# Patient Record
Sex: MALE | Race: WHITE | Employment: FULL TIME | URBAN - METROPOLITAN AREA
[De-identification: names, ages, dates, MRNs, and addresses within clinical notes are randomized per-mention and may not be internally consistent; named-entity substitution may affect disease eponyms.]

---

## 2017-01-27 ENCOUNTER — OFFICE VISIT (OUTPATIENT)
Dept: FAMILY MEDICINE | Facility: CLINIC | Age: 37
End: 2017-01-27
Payer: COMMERCIAL

## 2017-01-27 VITALS
DIASTOLIC BLOOD PRESSURE: 60 MMHG | SYSTOLIC BLOOD PRESSURE: 126 MMHG | OXYGEN SATURATION: 97 % | HEART RATE: 102 BPM | WEIGHT: 247.2 LBS | BODY MASS INDEX: 36.24 KG/M2 | TEMPERATURE: 99 F

## 2017-01-27 DIAGNOSIS — J02.0 STREP THROAT: Primary | ICD-10-CM

## 2017-01-27 DIAGNOSIS — Z13.6 CARDIOVASCULAR SCREENING; LDL GOAL LESS THAN 160: ICD-10-CM

## 2017-01-27 DIAGNOSIS — R07.0 THROAT PAIN: ICD-10-CM

## 2017-01-27 LAB
DEPRECATED S PYO AG THROAT QL EIA: ABNORMAL
MICRO REPORT STATUS: ABNORMAL
SPECIMEN SOURCE: ABNORMAL

## 2017-01-27 PROCEDURE — 99213 OFFICE O/P EST LOW 20 MIN: CPT | Performed by: NURSE PRACTITIONER

## 2017-01-27 PROCEDURE — 87880 STREP A ASSAY W/OPTIC: CPT | Performed by: NURSE PRACTITIONER

## 2017-01-27 RX ORDER — AMOXICILLIN 500 MG/1
500 CAPSULE ORAL 2 TIMES DAILY
Qty: 20 CAPSULE | Refills: 0 | Status: SHIPPED | OUTPATIENT
Start: 2017-01-27 | End: 2017-02-06

## 2017-01-27 NOTE — MR AVS SNAPSHOT
After Visit Summary   1/27/2017    Azam Chino    MRN: [id number]           Patient Information     Date Of Birth          1980        Visit Information        Provider Department      1/27/2017 1:40 PM Adrienne Russo APRN [facility]        Today's Diagnoses     Strep throat    -  1     Throat pain         CARDIOVASCULAR SCREENING; LDL GOAL LESS THAN 160           Care Instructions    Increase rest and fluids. Tylenol and/or Ibuprofen for comfort. Cool mist vaporizer. If your symptoms worsen or do not resolve follow up with your primary care provider in 2 weeks and sooner if needed.        Indications for emergent return to emergency department discussed with patient, who verbalized good understanding and agreement.  Patient understands the limitations of today's evaluation.           Pharyngitis: Strep (Confirmed)     You have had a positive test for strep throat. Strep throat is a contagious illness. It is spread by coughing, kissing or by touching others after touching your mouth or nose. Symptoms include throat pain which is worse with swallowing, aching all over, headache and fever. It is treated with antibiotic medication. This should help you start to feel better within 1-2 days.  Home care    Rest at home. Drink plenty of fluids to avoid dehydration.    No work or school for the first 2 days of taking the antibiotics. After this time, you will not be contagious. You can then return to school or work if you are feeling better.     The antibiotic medication must be taken for the full 10 days, even if you feel better. This is very important to ensure the infection is treated. It is also important to prevent drug-resistent organisms from developing. If you were given an antibiotic shot, no more antibiotics are needed.    You may use acetaminophen (Tylenol) or ibuprofen (Motrin, Advil) to control pain or fever, unless another medicine was prescribed for  this. (NOTE: If you have chronic liver or kidney disease or ever had a stomach ulcer or GI bleeding, talk with your doctor before using these medicines.)    Throat lozenges or sprays (such as Chloraseptic) help reduce pain. Gargling with warm salt water will also reduce throat pain. Dissolve 1/2 teaspoon of salt in 1 glass of warm water. This may be useful just before meals.     Soft foods are okay. Avoid salty or spicy foods.  Follow-up care  Follow up with your healthcare provider or our staff if you are not improving over the next week.  When to seek medical advice  Call your healthcare provider right away if any of these occur:    Fever as directed by your doctor     New or worsening ear pain, sinus pain, or headache    Painful lumps in the back of neck    Stiff neck    Lymph nodes are getting larger or becoming soft in the middle    Inability to swallow liquids, excessive drooling, or inability to open mouth wide due to throat pain    Signs of dehydration (very dark urine or no urine, sunken eyes, dizziness)    Trouble breathing or noisy breathing    Muffled voice    New rash    8220-3811 [employer]. [address] [place] [address]. All rights reserved. This information is not intended as a substitute for professional medical care. Always follow your healthcare professional's instructions.        Self-Care for Sore Throats  Sore throats occur for many reasons, such as colds, allergies, and infections caused by viruses or bacteria. In any case, your throat becomes red and sore. Your goal for self-care is to reduce your discomfort while giving your throat a chance to heal.    Moisten and Soothe Your Throat    Try a sip of water first thing after waking up.    Keep your throat moist by drinking 6 or more glasses of clear liquids every day.    Run a cool-air humidifier in your room overnight.    Avoid cigarette smoke.     Suck on throat lozenges, cough drops, hard candy, ice chips, or  frozen fruit-juice bars. Use the sugar-free versions if your diet or medical condition require them.  Gargle to Ease Irritation  Gargling every hour or 2 can ease irritation. Try gargling with 1 of these solutions:    1/4 teaspoon of salt in 1/2 cup of warm water    An over-the-counter anesthetic gargle  Use Medication for More Relief  Over-the-counter medication can reduce sore throat symptoms. Ask your pharmacist if you have questions about which medication to use:    Ease pain with anesthetic sprays. Aspirin or an aspirin substitute also helps. Remember, never give aspirin to anyone 18 or younger, or if you are already taking blood thinners.     For sore throats caused by allergies, try antihistamines to block the allergic reaction.    Remember: unless a sore throat is caused by a bacterial infection, antibiotics won t help you.  Prevent Future Sore Throats    Stop smoking or reduce contact with secondhand smoke. Smoke irritates the tender throat lining.    Limit contact with pets and with allergy-causing substances, such as pollen and mold.    When you re around someone with a sore throat or cold, wash your hands frequently to keep viruses or bacteria from spreading.    Don t strain your vocal cords.  Call Your Health Care Provider  Contact your doctor if you have:    A temperature over 101 F (38.3 C)    White spots on the throat    Great difficulty swallowing    Trouble breathing    A skin rash    Recent exposure to someone else with strep bacteria    Severe hoarseness and swollen glands in the neck or jaw     7634-5602 [employer]. [address] [place], [place] [address]. All rights reserved. This information is not intended as a substitute for professional medical care. Always follow your healthcare professional's instructions.              Follow-ups after your visit        Follow-up notes from your care team     See patient instructions section of the AVS Return in about 2 weeks (around  "2/10/2017), or if symptoms worsen or fail to improve, for Follow up with your primary care provider.      Future tests that were ordered for you today     Open Future Orders        Priority Expected Expires Ordered    Lipid Profile (Chol, Trig, HDL, LDL calc) Routine  2018            Who to contact     If you have questions or need follow up information about today's clinic visit or your schedule please contact [facility] directly at [phone].  Normal or non-critical lab and imaging results will be communicated to you by MyChart, letter or phone within 4 business days after the clinic has received the results. If you do not hear from us within 7 days, please contact the clinic through [facility] or phone. If you have a critical or abnormal lab result, we will notify you by phone as soon as possible.  Submit refill requests through Wire or call your pharmacy and they will forward the refill request to us. Please allow 3 business days for your refill to be completed.          Additional Information About Your Visit        [facility] Information     Wire lets you send messages to your doctor, view your test results, renew your prescriptions, schedule appointments and more. To sign up, go to [email or web address] . Click on \"Log in\" on the left side of the screen, which will take you to the Welcome page. Then click on \"Sign up Now\" on the right side of the page.     You will be asked to enter the access code listed below, as well as some personal information. Please follow the directions to create your username and password.     Your access code is: [email or web address]  Expires: 2017  2:32 PM     Your access code will  in 90 days. If you need help or a new code, please call your [facility] or [phone].        Care EveryWhere ID     This is your Care EveryWhere ID. This could be used by other organizations to access your [facility] medical records  [id number]      "   Your Vitals Were     Pulse Temperature Pulse Oximetry             102 99  F (37.2  C) (Tympanic) 97%          Blood Pressure from Last 3 Encounters:   01/27/17 126/60   01/14/16 138/90   08/05/15 126/81    Weight from Last 3 Encounters:   01/27/17 247 lb 3.2 oz (112.129 kg)   01/14/16 263 lb (119.296 kg)   08/05/15 262 lb (118.842 kg)              We Performed the Following     Strep, Rapid Screen          Today's Medication Changes          These changes are accurate as of: 1/27/17  2:32 PM.  If you have any questions, ask your nurse or doctor.               Start taking these medicines.        Dose/Directions    amoxicillin 500 MG capsule   Commonly known as:  AMOXIL   Used for:  Strep throat   Started by:  Adrienne Russo APRN CNP        Dose:  500 mg   Take 1 capsule (500 mg) by mouth 2 times daily for 10 days   Quantity:  20 capsule   Refills:  0            Where to get your medicines      These medications were sent to [id number] [address] [facility]  [address] [facility] [address]    Hours:  Closed 10-16-08 business to [facility] Phone:  [phone]    - amoxicillin 500 MG capsule             Primary Care Provider    Unknown Primary MD Zainab       No address on file        Thank you!     Thank you for choosing [facility]  for your care. Our goal is always to provide you with excellent care. Hearing back from our patients is one way we can continue to improve our services. Please take a few minutes to complete the written survey that you may receive in the mail after your visit with us. Thank you!             Your Updated Medication List - Protect others around you: Learn how to safely use, store and throw away your medicines at www.disposemymeds.org.          This list is accurate as of: 1/27/17  2:32 PM.  Always use your most recent med list.                   Brand Name Dispense Instructions for use    amoxicillin 500 MG capsule    AMOXIL     20 capsule    Take 1 capsule (500 mg) by mouth 2 times daily for 10 days       TYLENOL PO      Take 1,000 mg by mouth every 6 hours as needed for mild pain or fever

## 2017-01-27 NOTE — PROGRESS NOTES
SUBJECTIVE:                                                    Azam Chino is a 36 year old male who presents to clinic today for the following health issues:      Acute Illness   Acute illness concerns: sore throat   Onset: couple weeks ago      Fever: YES    Chills/Sweats: YES    Headache (location?): YES    Sinus Pressure:no    Conjunctivitis:  no    Ear Pain: no    Rhinorrhea: no    Congestion: no    Sore Throat: YES     Cough: no    Wheeze: no    Decreased Appetite: no    Nausea: no    Vomiting: no    Diarrhea:  no    Dysuria/Freq.: no    Fatigue/Achiness: no    Sick/Strep Exposure: YES     Therapies Tried and outcome: tylenol, nyquil- nothing has helped           Problem list and histories reviewed & adjusted, as indicated.  Additional history: as documented    Patient Active Problem List   Diagnosis     CARDIOVASCULAR SCREENING; LDL GOAL LESS THAN 160     History reviewed. No pertinent past surgical history.    Social History   Substance Use Topics     Smoking status: Current Every Day Smoker -- 15 years     Types: Dip, chew, snus or snuff     Smokeless tobacco: Current User     Types: Chew     Alcohol Use: Yes      Comment: occas.     Family History   Problem Relation Age of Onset     Eye Disorder Maternal Grandmother      blind     CANCER Paternal Grandmother      breast cancer     HEART DISEASE Paternal Grandfather      MI     CANCER Paternal Grandfather          Current Outpatient Prescriptions   Medication Sig Dispense Refill     Acetaminophen (TYLENOL PO) Take 1,000 mg by mouth every 6 hours as needed for mild pain or fever       amoxicillin (AMOXIL) 500 MG capsule Take 1 capsule (500 mg) by mouth 2 times daily for 10 days 20 capsule 0     No Known Allergies  Problem list, Medication list, Allergies, and Medical/Social/Surgical histories reviewed in EPIC and updated as appropriate.    ROS:  Constitutional, HEENT, cardiovascular, pulmonary, GI, , musculoskeletal, neuro, skin, endocrine and  psych systems are negative, except as otherwise noted.    OBJECTIVE:                                                    /60 mmHg  Pulse 102  Temp(Src) 99  F (37.2  C) (Tympanic)  Wt 247 lb 3.2 oz (112.129 kg)  SpO2 97%  Body mass index is 36.24 kg/(m^2).  GENERAL: healthy, alert and no distress, nontoxic in appearance  EYES: Eyes grossly normal to inspection, PERRL and conjunctivae and sclerae normal  HENT: ear canals and TM's normal, nose and mouth without ulcers or lesions  NECK: no adenopathy, supple with full ROM  RESP: lungs clear to auscultation - no rales, rhonchi or wheezes  CV: regular rate and rhythm, normal S1 S2, no S3 or S4, no murmur, click or rub, no peripheral edema   ABDOMEN: soft, nontender  MS: no gross musculoskeletal defects noted, no edema  No rash    Diagnostic Test Results:  Results for orders placed or performed in visit on 01/27/17 (from the past 24 hour(s))   Strep, Rapid Screen   Result Value Ref Range    Specimen Description Throat     Rapid Strep A Screen (A)      POSITIVE: Group A Streptococcal antigen detected by immunoassay.    Micro Report Status FINAL 01/27/2017         ASSESSMENT/PLAN:                                                      Problem List Items Addressed This Visit     CARDIOVASCULAR SCREENING; LDL GOAL LESS THAN 160    Relevant Orders    Lipid Profile (Chol, Trig, HDL, LDL calc)      Other Visit Diagnoses     Strep throat    -  Primary     Relevant Medications     amoxicillin (AMOXIL) 500 MG capsule     Throat pain         Relevant Orders     Strep, Rapid Screen (Completed)                Patient Instructions     Increase rest and fluids. Tylenol and/or Ibuprofen for comfort. Cool mist vaporizer. If your symptoms worsen or do not resolve follow up with your primary care provider in 2 weeks and sooner if needed.        Indications for emergent return to emergency department discussed with patient, who verbalized good understanding and agreement.  Patient  understands the limitations of today's evaluation.           Pharyngitis: Strep (Confirmed)     You have had a positive test for strep throat. Strep throat is a contagious illness. It is spread by coughing, kissing or by touching others after touching your mouth or nose. Symptoms include throat pain which is worse with swallowing, aching all over, headache and fever. It is treated with antibiotic medication. This should help you start to feel better within 1-2 days.  Home care    Rest at home. Drink plenty of fluids to avoid dehydration.    No work or school for the first 2 days of taking the antibiotics. After this time, you will not be contagious. You can then return to school or work if you are feeling better.     The antibiotic medication must be taken for the full 10 days, even if you feel better. This is very important to ensure the infection is treated. It is also important to prevent drug-resistent organisms from developing. If you were given an antibiotic shot, no more antibiotics are needed.    You may use acetaminophen (Tylenol) or ibuprofen (Motrin, Advil) to control pain or fever, unless another medicine was prescribed for this. (NOTE: If you have chronic liver or kidney disease or ever had a stomach ulcer or GI bleeding, talk with your doctor before using these medicines.)    Throat lozenges or sprays (such as Chloraseptic) help reduce pain. Gargling with warm salt water will also reduce throat pain. Dissolve 1/2 teaspoon of salt in 1 glass of warm water. This may be useful just before meals.     Soft foods are okay. Avoid salty or spicy foods.  Follow-up care  Follow up with your healthcare provider or our staff if you are not improving over the next week.  When to seek medical advice  Call your healthcare provider right away if any of these occur:    Fever as directed by your doctor     New or worsening ear pain, sinus pain, or headache    Painful lumps in the back of neck    Stiff neck    Lymph  nodes are getting larger or becoming soft in the middle    Inability to swallow liquids, excessive drooling, or inability to open mouth wide due to throat pain    Signs of dehydration (very dark urine or no urine, sunken eyes, dizziness)    Trouble breathing or noisy breathing    Muffled voice    New rash    5911-6704 [employer]. [address] [place] [address], [place] [address]. All rights reserved. This information is not intended as a substitute for professional medical care. Always follow your healthcare professional's instructions.        Self-Care for Sore Throats  Sore throats occur for many reasons, such as colds, allergies, and infections caused by viruses or bacteria. In any case, your throat becomes red and sore. Your goal for self-care is to reduce your discomfort while giving your throat a chance to heal.    Moisten and Soothe Your Throat    Try a sip of water first thing after waking up.    Keep your throat moist by drinking 6 or more glasses of clear liquids every day.    Run a cool-air humidifier in your room overnight.    Avoid cigarette smoke.     Suck on throat lozenges, cough drops, hard candy, ice chips, or frozen fruit-juice bars. Use the sugar-free versions if your diet or medical condition require them.  Gargle to Ease Irritation  Gargling every hour or 2 can ease irritation. Try gargling with 1 of these solutions:    1/4 teaspoon of salt in 1/2 cup of warm water    An over-the-counter anesthetic gargle  Use Medication for More Relief  Over-the-counter medication can reduce sore throat symptoms. Ask your pharmacist if you have questions about which medication to use:    Ease pain with anesthetic sprays. Aspirin or an aspirin substitute also helps. Remember, never give aspirin to anyone 18 or younger, or if you are already taking blood thinners.     For sore throats caused by allergies, try antihistamines to block the allergic reaction.    Remember: unless a sore throat is caused by a  bacterial infection, antibiotics won t help you.  Prevent Future Sore Throats    Stop smoking or reduce contact with secondhand smoke. Smoke irritates the tender throat lining.    Limit contact with pets and with allergy-causing substances, such as pollen and mold.    When you re around someone with a sore throat or cold, wash your hands frequently to keep viruses or bacteria from spreading.    Don t strain your vocal cords.  Call Your Health Care Provider  Contact your doctor if you have:    A temperature over 101 F (38.3 C)    White spots on the throat    Great difficulty swallowing    Trouble breathing    A skin rash    Recent exposure to someone else with strep bacteria    Severe hoarseness and swollen glands in the neck or jaw     6303-5241 [employer]. [address] [place] [address], [place] [id number]. All rights reserved. This information is not intended as a substitute for professional medical care. Always follow your healthcare professional's instructions.            HÉCTOR Mabry SAME DAY PROVIDER  [facility]

## 2017-01-27 NOTE — PATIENT INSTRUCTIONS
Increase rest and fluids. Tylenol and/or Ibuprofen for comfort. Cool mist vaporizer. If your symptoms worsen or do not resolve follow up with your primary care provider in 2 weeks and sooner if needed.        Indications for emergent return to emergency department discussed with patient, who verbalized good understanding and agreement.  Patient understands the limitations of today's evaluation.           Pharyngitis: Strep (Confirmed)     You have had a positive test for strep throat. Strep throat is a contagious illness. It is spread by coughing, kissing or by touching others after touching your mouth or nose. Symptoms include throat pain which is worse with swallowing, aching all over, headache and fever. It is treated with antibiotic medication. This should help you start to feel better within 1-2 days.  Home care    Rest at home. Drink plenty of fluids to avoid dehydration.    No work or school for the first 2 days of taking the antibiotics. After this time, you will not be contagious. You can then return to school or work if you are feeling better.     The antibiotic medication must be taken for the full 10 days, even if you feel better. This is very important to ensure the infection is treated. It is also important to prevent drug-resistent organisms from developing. If you were given an antibiotic shot, no more antibiotics are needed.    You may use acetaminophen (Tylenol) or ibuprofen (Motrin, Advil) to control pain or fever, unless another medicine was prescribed for this. (NOTE: If you have chronic liver or kidney disease or ever had a stomach ulcer or GI bleeding, talk with your doctor before using these medicines.)    Throat lozenges or sprays (such as Chloraseptic) help reduce pain. Gargling with warm salt water will also reduce throat pain. Dissolve 1/2 teaspoon of salt in 1 glass of warm water. This may be useful just before meals.     Soft foods are okay. Avoid salty or spicy foods.  Follow-up  care  Follow up with your healthcare provider or our staff if you are not improving over the next week.  When to seek medical advice  Call your healthcare provider right away if any of these occur:    Fever as directed by your doctor     New or worsening ear pain, sinus pain, or headache    Painful lumps in the back of neck    Stiff neck    Lymph nodes are getting larger or becoming soft in the middle    Inability to swallow liquids, excessive drooling, or inability to open mouth wide due to throat pain    Signs of dehydration (very dark urine or no urine, sunken eyes, dizziness)    Trouble breathing or noisy breathing    Muffled voice    New rash    1085-4465 [employer]. [address] [place] [address] 10090. All rights reserved. This information is not intended as a substitute for professional medical care. Always follow your healthcare professional's instructions.        Self-Care for Sore Throats  Sore throats occur for many reasons, such as colds, allergies, and infections caused by viruses or bacteria. In any case, your throat becomes red and sore. Your goal for self-care is to reduce your discomfort while giving your throat a chance to heal.    Moisten and Soothe Your Throat    Try a sip of water first thing after waking up.    Keep your throat moist by drinking 6 or more glasses of clear liquids every day.    Run a cool-air humidifier in your room overnight.    Avoid cigarette smoke.     Suck on throat lozenges, cough drops, hard candy, ice chips, or frozen fruit-juice bars. Use the sugar-free versions if your diet or medical condition require them.  Gargle to Ease Irritation  Gargling every hour or 2 can ease irritation. Try gargling with 1 of these solutions:    1/4 teaspoon of salt in 1/2 cup of warm water    An over-the-counter anesthetic gargle  Use Medication for More Relief  Over-the-counter medication can reduce sore throat symptoms. Ask your pharmacist if you have questions about  which medication to use:    Ease pain with anesthetic sprays. Aspirin or an aspirin substitute also helps. Remember, never give aspirin to anyone 18 or younger, or if you are already taking blood thinners.     For sore throats caused by allergies, try antihistamines to block the allergic reaction.    Remember: unless a sore throat is caused by a bacterial infection, antibiotics won t help you.  Prevent Future Sore Throats    Stop smoking or reduce contact with secondhand smoke. Smoke irritates the tender throat lining.    Limit contact with pets and with allergy-causing substances, such as pollen and mold.    When you re around someone with a sore throat or cold, wash your hands frequently to keep viruses or bacteria from spreading.    Don t strain your vocal cords.  Call Your Health Care Provider  Contact your doctor if you have:    A temperature over 101 F (38.3 C)    White spots on the throat    Great difficulty swallowing    Trouble breathing    A skin rash    Recent exposure to someone else with strep bacteria    Severe hoarseness and swollen glands in the neck or jaw     7874-6446 [employer] [address] [place] [address], [place] [address]. All rights reserved. This information is not intended as a substitute for professional medical care. Always follow your healthcare professional's instructions.

## 2017-02-23 ENCOUNTER — OFFICE VISIT (OUTPATIENT)
Dept: FAMILY MEDICINE | Facility: CLINIC | Age: 37
End: 2017-02-23
Payer: COMMERCIAL

## 2017-02-23 VITALS
WEIGHT: 250 LBS | SYSTOLIC BLOOD PRESSURE: 124 MMHG | DIASTOLIC BLOOD PRESSURE: 60 MMHG | TEMPERATURE: 98.6 F | HEART RATE: 105 BPM | BODY MASS INDEX: 36.65 KG/M2 | OXYGEN SATURATION: 94 %

## 2017-02-23 DIAGNOSIS — R07.0 THROAT PAIN: Primary | ICD-10-CM

## 2017-02-23 PROCEDURE — 87880 STREP A ASSAY W/OPTIC: CPT | Performed by: FAMILY MEDICINE

## 2017-02-23 PROCEDURE — 99213 OFFICE O/P EST LOW 20 MIN: CPT | Performed by: FAMILY MEDICINE

## 2017-02-23 NOTE — MR AVS SNAPSHOT
"              After Visit Summary   2017    Azam Chino    MRN: [id number]           Patient Information     Date Of Birth          1980        Visit Information        Provider Department      2017 1:20 PM Hemanth Reilly MD [facility]        Today's Diagnoses     Throat pain    -  1      Care Instructions    1. Ten days on meds    2. Call if any persistence    3. If it re occurs will do IM injection        Follow-ups after your visit        Who to contact     If you have questions or need follow up information about today's clinic visit or your schedule please contact [facility] directly at [phone].  Normal or non-critical lab and imaging results will be communicated to you by MyChart, letter or phone within 4 business days after the clinic has received the results. If you do not hear from us within 7 days, please contact the clinic through [employer] or phone. If you have a critical or abnormal lab result, we will notify you by phone as soon as possible.  Submit refill requests through [employer] or call your pharmacy and they will forward the refill request to us. Please allow 3 business days for your refill to be completed.          Additional Information About Your Visit        MyChart Information     [employer] lets you send messages to your doctor, view your test results, renew your prescriptions, schedule appointments and more. To sign up, go to [email or web address] . Click on \"Log in\" on the left side of the screen, which will take you to the Welcome page. Then click on \"Sign up Now\" on the right side of the page.     You will be asked to enter the access code listed below, as well as some personal information. Please follow the directions to create your username and password.     Your access code is: [email or web address]  Expires: 2017  2:32 PM     Your access code will  in 90 days. If you need help or a new code, please call your " [facility] or [phone].        Care EveryWhere ID     This is your Care EveryWhere ID. This could be used by other organizations to access your [facility] medical records  [id number]        Your Vitals Were     Pulse Temperature Pulse Oximetry BMI (Body Mass Index)          105 98.6  F (37  C) (Tympanic) 94% 36.65 kg/m2         Blood Pressure from Last 3 Encounters:   02/23/17 124/60   01/27/17 126/60   01/14/16 138/90    Weight from Last 3 Encounters:   02/23/17 250 lb (113.4 kg)   01/27/17 247 lb 3.2 oz (112.1 kg)   01/14/16 263 lb (119.3 kg)              We Performed the Following     Strep, Rapid Screen          Today's Medication Changes          These changes are accurate as of: 2/23/17  1:54 PM.  If you have any questions, ask your nurse or doctor.               Start taking these medicines.        Dose/Directions    amoxicillin-clavulanate 875-125 MG per tablet   Commonly known as:  AUGMENTIN   Used for:  Throat pain   Started by:  Hemanth Reilly MD        Dose:  1 tablet   Take 1 tablet by mouth 2 times daily   Quantity:  20 tablet   Refills:  0            Where to get your medicines      These medications were sent to [id number] [address] [facility] [address] [facility]  [address] [facility] [address]    Hours:  Closed 10-16-08 business to [facility] Phone:  [phone]     amoxicillin-clavulanate 875-125 MG per tablet                Primary Care Provider    Unknown Primary MD Zainab       No address on file        Thank you!     Thank you for choosing [facility]  for your care. Our goal is always to provide you with excellent care. Hearing back from our patients is one way we can continue to improve our services. Please take a few minutes to complete the written survey that you may receive in the mail after your visit with us. Thank you!             Your Updated Medication List - Protect others around you: Learn how to safely use, store and  throw away your medicines at www.disposemymeds.org.          This list is accurate as of: 2/23/17  1:54 PM.  Always use your most recent med list.                   Brand Name Dispense Instructions for use    amoxicillin-clavulanate 875-125 MG per tablet    AUGMENTIN    20 tablet    Take 1 tablet by mouth 2 times daily       TYLENOL PO      Take 1,000 mg by mouth every 6 hours as needed for mild pain or fever

## 2017-02-23 NOTE — NURSING NOTE
"Chief Complaint   Patient presents with     Cough     Fever     fatigue       Initial /60  Pulse 105  Temp 98.6  F (37  C) (Tympanic)  Wt 250 lb (113.4 kg)  SpO2 94%  BMI 36.65 kg/m2 Estimated body mass index is 36.65 kg/(m^2) as calculated from the following:    Height as of 8/5/15: 5' 9.25\" (1.759 m).    Weight as of this encounter: 250 lb (113.4 kg).  Medication Reconciliation: complete    Health Maintenance that is potentially due pending provider review:  NONE    n/a      "

## 2017-02-23 NOTE — PROGRESS NOTES
SUBJECTIVE:                                                    Azam Chino is a 36 year old male who presents to clinic today for the following health issues:      ENT Symptoms             Symptoms: cc Present Absent Comment   Fever/Chills  x     Fatigue  x     Muscle Aches  x     Eye Irritation   x    Sneezing   x    Nasal Tyrel/Drg  x     Sinus Pressure/Pain   x    Loss of smell   x    Dental pain   x    Sore Throat x      Swollen Glands  x     Ear Pain/Fullness   x    Cough x      Wheeze   x    Chest Pain  x  Had strep - hurts to take deep breath   Shortness of breath  x     Rash       Other         Symptom duration:  2 days   Symptom severity:  severe   Treatments tried:  tylenol, cold medicine   Contacts:  co-workers             Problem list and histories reviewed & adjusted, as indicated.  Additional history:     Patient Active Problem List   Diagnosis     CARDIOVASCULAR SCREENING; LDL GOAL LESS THAN 160     History reviewed. No pertinent past surgical history.    Social History   Substance Use Topics     Smoking status: Current Every Day Smoker     Years: 15.00     Types: Dip, chew, snus or snuff     Smokeless tobacco: Current User     Types: Chew     Alcohol use Yes      Comment: occas.     Family History   Problem Relation Age of Onset     Eye Disorder Maternal Grandmother      blind     CANCER Paternal Grandmother      breast cancer     HEART DISEASE Paternal Grandfather      MI     CANCER Paternal Grandfather          Current Outpatient Prescriptions   Medication Sig Dispense Refill     Acetaminophen (TYLENOL PO) Take 1,000 mg by mouth every 6 hours as needed for mild pain or fever         ROS:  C: NEGATIVE for fever, chills, change in weight  E/M: NEGATIVE for ear, mouth and throat problems    OBJECTIVE:                                                    /60  Pulse 105  Temp 98.6  F (37  C) (Tympanic)  Wt 250 lb (113.4 kg)  SpO2 94%  BMI 36.65 kg/m2  Body mass index is 36.65  kg/(m^2).  GENERAL: healthy, alert and no distress  HENT: normal cephalic/atraumatic, ear canals and TM's normal, nose and mouth without ulcers or lesions, oropharynx clear, oral mucous membranes moist and tonsillar erythema  MS: no gross musculoskeletal defects noted, no edema         ASSESSMENT/PLAN:                                                            1. Throat pain  Positive strep throat. Reoccurrence.  - Strep, Rapid Screen  - amoxicillin-clavulanate (AUGMENTIN) 875-125 MG per tablet; Take 1 tablet by mouth 2 times daily  Dispense: 20 tablet; Refill: 0    ASSESSMENT/PLAN:      ICD-10-CM    1. Throat pain R07.0 Strep, Rapid Screen     amoxicillin-clavulanate (AUGMENTIN) 875-125 MG per tablet       Patient Instructions   1. Ten days on meds    2. Call if any persistence    3. If it re occurs will do IM injection          Hemanth Reilly MD  [facility]

## 2017-12-22 ENCOUNTER — OFFICE VISIT (OUTPATIENT)
Dept: FAMILY MEDICINE | Facility: CLINIC | Age: 37
End: 2017-12-22
Payer: COMMERCIAL

## 2017-12-22 VITALS
BODY MASS INDEX: 37.21 KG/M2 | TEMPERATURE: 97.1 F | DIASTOLIC BLOOD PRESSURE: 88 MMHG | SYSTOLIC BLOOD PRESSURE: 144 MMHG | WEIGHT: 253.8 LBS | OXYGEN SATURATION: 98 % | HEART RATE: 80 BPM

## 2017-12-22 DIAGNOSIS — J20.9 ACUTE BRONCHITIS WITH SYMPTOMS > 10 DAYS: Primary | ICD-10-CM

## 2017-12-22 PROCEDURE — 99213 OFFICE O/P EST LOW 20 MIN: CPT | Performed by: PHYSICIAN ASSISTANT

## 2017-12-22 RX ORDER — AZITHROMYCIN 250 MG/1
TABLET, FILM COATED ORAL
Qty: 6 TABLET | Refills: 0 | Status: SHIPPED | OUTPATIENT
Start: 2017-12-22 | End: 2018-09-14

## 2017-12-22 RX ORDER — CODEINE PHOSPHATE AND GUAIFENESIN 10; 100 MG/5ML; MG/5ML
1-2 SOLUTION ORAL EVERY 4 HOURS PRN
Qty: 120 ML | Refills: 0 | Status: SHIPPED | OUTPATIENT
Start: 2017-12-22 | End: 2018-09-14

## 2017-12-22 ASSESSMENT — ENCOUNTER SYMPTOMS
MUSCULOSKELETAL NEGATIVE: 1
HEADACHES: 1
WEAKNESS: 0
STRIDOR: 0
SORE THROAT: 0
SPUTUM PRODUCTION: 0
VOMITING: 0
WHEEZING: 0
EYE PAIN: 0
COUGH: 1
EYE DISCHARGE: 0
CHILLS: 0
SHORTNESS OF BREATH: 0
FEVER: 0
PSYCHIATRIC NEGATIVE: 1
NAUSEA: 0
EYE REDNESS: 0

## 2017-12-22 NOTE — NURSING NOTE
"Chief Complaint   Patient presents with     URI       Initial /88 (BP Location: Right arm, Patient Position: Chair, Cuff Size: Adult Large)  Pulse 80  Temp 97.1  F (36.2  C) (Tympanic)  Wt 253 lb 12.8 oz (115.1 kg)  SpO2 98%  BMI 37.21 kg/m2 Estimated body mass index is 37.21 kg/(m^2) as calculated from the following:    Height as of 8/5/15: 5' 9.25\" (1.759 m).    Weight as of this encounter: 253 lb 12.8 oz (115.1 kg).  Medication Reconciliation: complete    Health Maintenance that is potentially due pending provider review:  NONE    n/a    Is there anyone who you would like to be able to receive your results? No  If yes have patient fill out ERLIN    Helga HERNANDEZ CMA    "

## 2017-12-22 NOTE — PROGRESS NOTES
HPI    SUBJECTIVE:   Azam Chino is a 36 year old male who presents to clinic today for the following health issues:      ENT Symptoms             Symptoms: cc Present Absent Comment   Fever/Chills   x    Fatigue  x     Muscle Aches  x     Eye Irritation   x    Sneezing  x     Nasal Tyrel/Drg  x     Sinus Pressure/Pain   x    Loss of smell   x    Dental pain   x    Sore Throat  x  From coughing    Swollen Glands   x    Ear Pain/Fullness   x    Cough  x     Wheeze   x    Chest Pain  x     Shortness of breath   x    Rash       Other         Symptom duration:  One and a half weeks    Symptom severity:     Treatments tried:     Contacts:  Girlfriend     Problem list and histories reviewed & adjusted, as indicated.  Additional history: as documented    Patient Active Problem List   Diagnosis     CARDIOVASCULAR SCREENING; LDL GOAL LESS THAN 160     History reviewed. No pertinent surgical history.    Social History   Substance Use Topics     Smoking status: Current Every Day Smoker     Years: 15.00     Types: Dip, chew, snus or snuff     Smokeless tobacco: Current User     Types: Chew     Alcohol use Yes      Comment: occas.     Family History   Problem Relation Age of Onset     Eye Disorder Maternal Grandmother      blind     CANCER Paternal Grandmother      breast cancer     HEART DISEASE Paternal Grandfather      MI     CANCER Paternal Grandfather          Current Outpatient Prescriptions   Medication Sig Dispense Refill     azithromycin (ZITHROMAX) 250 MG tablet Two tablets first day, then one tablet daily for four days. 6 tablet 0     guaiFENesin-codeine (ROBITUSSIN AC) 100-10 MG/5ML SOLN solution Take 5-10 mLs by mouth every 4 hours as needed for cough 120 mL 0     Acetaminophen (TYLENOL PO) Take 1,000 mg by mouth every 6 hours as needed for mild pain or fever       No Known Allergies  Labs reviewed in EPIC      Reviewed and updated as needed this visit by clinical staff     Reviewed and updated as needed this  visit by Provider       Review of Systems   Constitutional: Negative for chills and fever.   HENT: Positive for congestion. Negative for ear discharge, ear pain, hearing loss and sore throat.    Eyes: Negative for pain, discharge and redness.   Respiratory: Positive for cough. Negative for sputum production, shortness of breath, wheezing and stridor.    Cardiovascular: Negative for chest pain.   Gastrointestinal: Negative for nausea and vomiting.   Genitourinary: Negative.    Musculoskeletal: Negative.    Skin: Negative for itching and rash.   Neurological: Positive for headaches. Negative for weakness.   Endo/Heme/Allergies: Negative.    Psychiatric/Behavioral: Negative.          Physical Exam   Constitutional: He is oriented to person, place, and time and well-developed, well-nourished, and in no distress.   HENT:   Head: Normocephalic and atraumatic.   Right Ear: Hearing, tympanic membrane, external ear and ear canal normal.   Left Ear: Hearing, tympanic membrane, external ear and ear canal normal.   Nose: Rhinorrhea present. No septal deviation. Right sinus exhibits maxillary sinus tenderness. Left sinus exhibits maxillary sinus tenderness.   Mouth/Throat: Oropharyngeal exudate, posterior oropharyngeal edema and posterior oropharyngeal erythema present. No tonsillar abscesses.   Eyes: Conjunctivae and EOM are normal. Pupils are equal, round, and reactive to light. Right eye exhibits no discharge. Left eye exhibits no discharge. No scleral icterus.   Neck: Normal range of motion. Neck supple. No thyromegaly present.   Cardiovascular: Normal rate, regular rhythm, normal heart sounds and intact distal pulses.  Exam reveals no gallop and no friction rub.    No murmur heard.  Pulmonary/Chest: Effort normal. No respiratory distress. He has wheezes. He has no rales. He exhibits no tenderness.   Abdominal: Soft. Bowel sounds are normal. He exhibits no distension and no mass. There is no tenderness. There is no rebound  and no guarding.   Musculoskeletal: Normal range of motion. He exhibits no edema or tenderness.   Lymphadenopathy:     He has no cervical adenopathy.   Neurological: He is alert and oriented to person, place, and time. He has normal reflexes. No cranial nerve deficit. He exhibits normal muscle tone. Gait normal. Coordination normal.   Skin: Skin is warm and dry. No rash noted. No erythema.   Psychiatric: Mood, memory, affect and judgment normal.       (J20.9) Acute bronchitis with symptoms > 10 days  (primary encounter diagnosis)  Comment:   Plan: azithromycin (ZITHROMAX) 250 MG tablet,         guaiFENesin-codeine (ROBITUSSIN AC) 100-10         MG/5ML SOLN solution         This could be a viral etiology and he will wait a couple days to see if his symptoms are slowly improving.  If not he will initiate the antibiotics.

## 2017-12-22 NOTE — MR AVS SNAPSHOT
"              After Visit Summary   2017    Azam Chino    MRN: [id number]           Patient Information     Date Of Birth          1980        Visit Information        Provider Department      2017 8:40 AM Krish Urban PA-C [facility]        Today's Diagnoses     Acute bronchitis with symptoms > 10 days    -  1       Follow-ups after your visit        Follow-up notes from your care team     Return if symptoms worsen or fail to improve.      Who to contact     If you have questions or need follow up information about today's clinic visit or your schedule please contact [facility] directly at [phone].  Normal or non-critical lab and imaging results will be communicated to you by Echo ithart, letter or phone within 4 business days after the clinic has received the results. If you do not hear from us within 7 days, please contact the clinic through Echo ithart or phone. If you have a critical or abnormal lab result, we will notify you by phone as soon as possible.  Submit refill requests through Meijob or call your pharmacy and they will forward the refill request to us. Please allow 3 business days for your refill to be completed.          Additional Information About Your Visit        MyChart Information     Meijob lets you send messages to your doctor, view your test results, renew your prescriptions, schedule appointments and more. To sign up, go to [email or web address] . Click on \"Log in\" on the left side of the screen, which will take you to the Welcome page. Then click on \"Sign up Now\" on the right side of the page.     You will be asked to enter the access code listed below, as well as some personal information. Please follow the directions to create your username and password.     Your access code is: [email or web address]  Expires: 3/22/2018  9:35 AM     Your access code will  in 90 days. If you need help or a new code, please call your [facility] " clinic or [phone].        Care EveryWhere ID     This is your Care EveryWhere ID. This could be used by other organizations to access your [facility] medical records  [id number]        Your Vitals Were     Pulse Temperature Pulse Oximetry BMI (Body Mass Index)          80 97.1  F (36.2  C) (Tympanic) 98% 37.21 kg/m2         Blood Pressure from Last 3 Encounters:   12/22/17 144/88   02/23/17 124/60   01/27/17 126/60    Weight from Last 3 Encounters:   12/22/17 253 lb 12.8 oz (115.1 kg)   02/23/17 250 lb (113.4 kg)   01/27/17 247 lb 3.2 oz (112.1 kg)              Today, you had the following     No orders found for display         Today's Medication Changes          These changes are accurate as of: 12/22/17  9:35 AM.  If you have any questions, ask your nurse or doctor.               Start taking these medicines.        Dose/Directions    azithromycin 250 MG tablet   Commonly known as:  ZITHROMAX   Used for:  Acute bronchitis with symptoms > 10 days   Started by:  Krish Urban PA-C        Two tablets first day, then one tablet daily for four days.   Quantity:  6 tablet   Refills:  0       guaiFENesin-codeine 100-10 MG/5ML Soln solution   Commonly known as:  ROBITUSSIN AC   Used for:  Acute bronchitis with symptoms > 10 days   Started by:  Krish Urban PA-C        Dose:  1-2 tsp.   Take 5-10 mLs by mouth every 4 hours as needed for cough   Quantity:  120 mL   Refills:  0            Where to get your medicines      These medications were sent to [facility] [address] [address]     Phone:  [phone]     azithromycin 250 MG tablet         Some of these will need a paper prescription and others can be bought over the counter.  Ask your nurse if you have questions.     Bring a paper prescription for each of these medications     guaiFENesin-codeine 100-10 MG/5ML Soln solution                Primary Care Provider Office Phone # Fae  #    [facility] [phone]       [address] [place] [address]        Equal Access to Services     ANITHA GLASS : Hadii aad ku hadvirginie Florez, brannon xiaobrandon, raf kacookie carmona, kelsey mora laArnoltracie mcghee. So [facility] [phone].    ATENCIÓN: Si habla español, tiene a lombardi disposición servicios gratuitos de asistencia lingüística. Llame al [phone].    We comply with applicable federal civil rights laws and [place] laws. We do not discriminate on the basis of race, color, national origin, age, disability, sex, sexual orientation, or gender identity.            Thank you!     Thank you for choosing [facility]  for your care. Our goal is always to provide you with excellent care. Hearing back from our patients is one way we can continue to improve our services. Please take a few minutes to complete the written survey that you may receive in the mail after your visit with us. Thank you!             Your Updated Medication List - Protect others around you: Learn how to safely use, store and throw away your medicines at www.disposemymeds.org.          This list is accurate as of: 12/22/17  9:35 AM.  Always use your most recent med list.                   Brand Name Dispense Instructions for use Diagnosis    azithromycin 250 MG tablet    ZITHROMAX    6 tablet    Two tablets first day, then one tablet daily for four days.    Acute bronchitis with symptoms > 10 days       guaiFENesin-codeine 100-10 MG/5ML Soln solution    ROBITUSSIN AC    120 mL    Take 5-10 mLs by mouth every 4 hours as needed for cough    Acute bronchitis with symptoms > 10 days       TYLENOL PO      Take 1,000 mg by mouth every 6 hours as needed for mild pain or fever

## 2018-09-14 ENCOUNTER — OFFICE VISIT (OUTPATIENT)
Dept: FAMILY MEDICINE | Facility: CLINIC | Age: 38
End: 2018-09-14
Payer: COMMERCIAL

## 2018-09-14 ENCOUNTER — RADIANT APPOINTMENT (OUTPATIENT)
Dept: GENERAL RADIOLOGY | Facility: CLINIC | Age: 38
End: 2018-09-14
Attending: FAMILY MEDICINE
Payer: COMMERCIAL

## 2018-09-14 VITALS
HEIGHT: 69 IN | HEART RATE: 76 BPM | TEMPERATURE: 97.7 F | DIASTOLIC BLOOD PRESSURE: 88 MMHG | RESPIRATION RATE: 16 BRPM | OXYGEN SATURATION: 98 % | BODY MASS INDEX: 36.43 KG/M2 | SYSTOLIC BLOOD PRESSURE: 126 MMHG | WEIGHT: 246 LBS

## 2018-09-14 DIAGNOSIS — R07.89 ATYPICAL CHEST PAIN: ICD-10-CM

## 2018-09-14 DIAGNOSIS — Z23 NEED FOR VACCINATION: ICD-10-CM

## 2018-09-14 DIAGNOSIS — R07.89 ATYPICAL CHEST PAIN: Primary | ICD-10-CM

## 2018-09-14 PROCEDURE — 93000 ELECTROCARDIOGRAM COMPLETE: CPT | Performed by: FAMILY MEDICINE

## 2018-09-14 PROCEDURE — 90471 IMMUNIZATION ADMIN: CPT | Performed by: FAMILY MEDICINE

## 2018-09-14 PROCEDURE — 90715 TDAP VACCINE 7 YRS/> IM: CPT | Performed by: FAMILY MEDICINE

## 2018-09-14 PROCEDURE — 71046 X-RAY EXAM CHEST 2 VIEWS: CPT | Mod: FY

## 2018-09-14 PROCEDURE — 99214 OFFICE O/P EST MOD 30 MIN: CPT | Mod: 25 | Performed by: FAMILY MEDICINE

## 2018-09-14 ASSESSMENT — PAIN SCALES - GENERAL: PAINLEVEL: MILD PAIN (2)

## 2018-09-14 NOTE — PATIENT INSTRUCTIONS
ASSESSMENT:   (R07.89) Atypical chest pain  (primary encounter diagnosis)  Comment: There is no sign of heart or lung problem causing the pain.  This appears to be related to something in the chest wall-muscles or joints where ribs meed the breast bone.   Plan: XR Chest 2 Views, EKG 12-lead complete w/read -        Clinics        I don't think you need additional tests.  OK for the Aleve.      Pain and anti-inflammatory medication options:  Ibuprofen 200mg OTC may be taken up to 4 pills 4 times a day to the maximum of 3200mg or 16 pills daily as needed for pain.     Naproxen (Aleve) OTC may be taken up to 2 pills 2 times a day to the maximum of 4 pills daily as needed for pain.     Aspirin may be taken up to 2-3 pills every 4 hours as needed for pain.     Take only one type of these at a time and take with food as they all can irritate stomach and cause ulcers.      Other pain medication:  Acetaminophen (Tylenol) may be taken as needed for pain and fever.  The maximum doses are listed below, around 3000mg a day.  Regular strength pills are 325mg.  The maximum daily dose is two pills (650mg) every 4 to 6 hours up to 3250mg a day.   Extra strength pills are 500mg each.  The maximum dose is two pills (1000mg) every 6 hours as needed up to 3000mg a day.   Extended release pills are 650mg each.  The maximum dose is two pills (1300mg) every 8 hours as needed up to 3900mg a day.     Watch out for acetaminophen in other over the counter or prescription medications.      Too much acetaminophen can lead to serious liver damage.  It is OK to take acetaminophen with above anti-inflammatory medications.    Heat and ice are options.   Stretches are often helpful.   Muscle relaxer medications can be used but can cause drowsiness.     Recheck if pain changes or getting worse.  I think it will improve over time.

## 2018-09-14 NOTE — PROGRESS NOTES
"  SUBJECTIVE:   Azam Chino is a 37 year old male who presents to clinic today for the following health issues:  Chief Complaint   Patient presents with     Chest Pain     tightness and pressure intermittent x 2-3 months.      Chest Pain      Onset: since late May    Description (location/character/radiation/duration): center- pressure type of pain    Intensity:  2/10 at its worst, 7/10    Accompanying signs and symptoms:        Shortness of breath: no but has felt sob in the humidity       Sweating: not sure states works in a very hot enviroment       Nausea/vomitting: no        Palpitations: no        Other (fevers/chills/cough/heartburn/lightheadedness): no     History (similar episodes/previous evaluation): None    Precipitating or alleviating factors:       Worse with exertion: possibly       Worse with breathing: YES- sometimes       Related to eating: no        Better with burping: no     Therapies tried and outcome: Aleve     Location: central anterior chest-pressure.  More right than left.  Some stabbing.   Triggering factors:?   It has not been exertional.  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  No change with eating or swallowing.  Aggravating factors: inhalation.  Reaching or twisting.   Always a little sore there.  Feels like it need to be cracked or popped.   Seems to \"make pain noises at night\" noted by partner.    Frequency: present daily but some days worse.   Duration: off and on through the day every hour or two.   Always  Little cough like clearing his throat.  No shortness of breath.   Some phlegm in throat in AM.  Some rhinorrhea in AM.   Alleviating factors: ?    He has family history heart disease in father and grandfather.     Patient Active Problem List   Diagnosis     CARDIOVASCULAR SCREENING; LDL GOAL LESS THAN 160      No chronic illness.     ROS:  Seems like a little less endurance than he had when younger. GI: No nausea, vomiting,  heartburn, abdominal " "pain, diarrhea, constipation or change in bowel habits     OBJECTIVE:Blood pressure 126/88, pulse 76, temperature 97.7  F (36.5  C), temperature source Tympanic, resp. rate 16, height 5' 9\" (1.753 m), weight 246 lb (111.6 kg), SpO2 98 %. BMI=Body mass index is 36.33 kg/(m^2).  GENERAL APPEARANCE ADULT: Alert, no acute distress, obese  HENT: oral mucous membranes moist, oropharynx clear  NECK: No adenopathy,masses or thyromegaly  RESP: lungs clear to auscultation   CV: normal rate, regular rhythm, no murmur or gallop  ABDOMEN: soft, no organomegaly, masses or tenderness  MS: extremities normal, no peripheral edema   NO chest wall tenderness.   I could not reproduce pain with various torso and arm movements.  EKG: NSR, normal   chest x-ray:normal by my interpretation, I independently visualized the xrays.     ASSESSMENT:   (R07.89) Atypical chest pain  (primary encounter diagnosis)  Comment: There is no sign of heart or lung problem causing the pain.  This appears to be related to something in the chest wall-muscles or joints where ribs meed the breast bone.   Plan: XR Chest 2 Views, EKG 12-lead complete w/read -        Clinics        I don't think you need additional tests.  OK for the Aleve.      Pain and anti-inflammatory medication options:  Ibuprofen 200mg OTC may be taken up to 4 pills 4 times a day to the maximum of 3200mg or 16 pills daily as needed for pain.     Naproxen (Aleve) OTC may be taken up to 2 pills 2 times a day to the maximum of 4 pills daily as needed for pain.     Aspirin may be taken up to 2-3 pills every 4 hours as needed for pain.     Take only one type of these at a time and take with food as they all can irritate stomach and cause ulcers.      Other pain medication:  Acetaminophen (Tylenol) may be taken as needed for pain and fever.  The maximum doses are listed below, around 3000mg a day.  Regular strength pills are 325mg.  The maximum daily dose is two pills (650mg) every 4 to 6 hours up " to 3250mg a day.   Extra strength pills are 500mg each.  The maximum dose is two pills (1000mg) every 6 hours as needed up to 3000mg a day.   Extended release pills are 650mg each.  The maximum dose is two pills (1300mg) every 8 hours as needed up to 3900mg a day.     Watch out for acetaminophen in other over the counter or prescription medications.      Too much acetaminophen can lead to serious liver damage.  It is OK to take acetaminophen with above anti-inflammatory medications.    Heat and ice are options.   Stretches are often helpful.   Muscle relaxer medications can be used but can cause drowsiness.     Recheck if pain changes or getting worse.  I think it will improve over time.

## 2018-09-14 NOTE — MR AVS SNAPSHOT
After Visit Summary   9/14/2018    Azam Chino    MRN: [id number]           Patient Information     Date Of Birth          1980        Visit Information        Provider Department      9/14/2018 8:40 AM Hemanth Villalta MD [facility]        Today's Diagnoses     Atypical chest pain    -  1      Care Instructions    ASSESSMENT:   (R07.89) Atypical chest pain  (primary encounter diagnosis)  Comment: There is no sign of heart or lung problem causing the pain.  This appears to be related to something in the chest wall-muscles or joints where ribs meed the breast bone.   Plan: XR Chest 2 Views, EKG 12-lead complete w/read -        Clinics        I don't think you need additional tests.  OK for the Aleve.      Pain and anti-inflammatory medication options:  Ibuprofen 200mg OTC may be taken up to 4 pills 4 times a day to the maximum of 3200mg or 16 pills daily as needed for pain.     Naproxen (Aleve) OTC may be taken up to 2 pills 2 times a day to the maximum of 4 pills daily as needed for pain.     Aspirin may be taken up to 2-3 pills every 4 hours as needed for pain.     Take only one type of these at a time and take with food as they all can irritate stomach and cause ulcers.      Other pain medication:  Acetaminophen (Tylenol) may be taken as needed for pain and fever.  The maximum doses are listed below, around 3000mg a day.  Regular strength pills are 325mg.  The maximum daily dose is two pills (650mg) every 4 to 6 hours up to 3250mg a day.   Extra strength pills are 500mg each.  The maximum dose is two pills (1000mg) every 6 hours as needed up to 3000mg a day.   Extended release pills are 650mg each.  The maximum dose is two pills (1300mg) every 8 hours as needed up to 3900mg a day.     Watch out for acetaminophen in other over the counter or prescription medications.      Too much acetaminophen can lead to serious liver damage.  It is OK to take acetaminophen with  "above anti-inflammatory medications.    Heat and ice are options.   Stretches are often helpful.   Muscle relaxer medications can be used but can cause drowsiness.     Recheck if pain changes or getting worse.  I think it will improve over time.           Follow-ups after your visit        Who to contact     If you have questions or need follow up information about today's clinic visit or your schedule please contact [facility] directly at [phone].  Normal or non-critical lab and imaging results will be communicated to you by MyChart, letter or phone within 4 business days after the clinic has received the results. If you do not hear from us within 7 days, please contact the clinic through Kleermailhart or phone. If you have a critical or abnormal lab result, we will notify you by phone as soon as possible.  Submit refill requests through [employer] or call your pharmacy and they will forward the refill request to us. Please allow 3 business days for your refill to be completed.          Additional Information About Your Visit        Care EveryWhere ID     This is your Care EveryWhere ID. This could be used by other organizations to access your [facility] medical records  [id number]        Your Vitals Were     Pulse Temperature Respirations Height Pulse Oximetry BMI (Body Mass Index)    76 97.7  F (36.5  C) (Tympanic) 16 5' 9\" (1.753 m) 98% 36.33 kg/m2       Blood Pressure from Last 3 Encounters:   09/14/18 126/88   12/22/17 144/88   02/23/17 124/60    Weight from Last 3 Encounters:   09/14/18 246 lb (111.6 kg)   12/22/17 253 lb 12.8 oz (115.1 kg)   02/23/17 250 lb (113.4 kg)              We Performed the Following     EKG 12-lead complete w/read - Clinics          Today's Medication Changes          These changes are accurate as of 9/14/18 10:25 AM.  If you have any questions, ask your nurse or doctor.               Stop taking these medicines if you haven't already. Please contact your care team if " you have questions.     TYLENOL PO   Stopped by:  Hemanth Villalta MD                    Primary Care Provider Office Phone # Fax #    [facility] [phone] [address] [place] [address]        Equal Access to Services     ALLYYADIRA MARIA LUISA : Hadii binta ku hadkellyo Soomaali, waaxda luqadaha, qaybta kaalmada adeegyada, waxzack idiin haynabeeln anuradhaniranjan mora rich mcghee. So [facility] [phone].    ATENCIÓN: Si habla español, tiene a lombardi disposición servicios gratuitos de asistencia lingüística. Llame al [phone].    We comply with applicable federal civil rights laws and [place] laws. We do not discriminate on the basis of race, color, national origin, age, disability, sex, sexual orientation, or gender identity.            Thank you!     Thank you for choosing [facility]  for your care. Our goal is always to provide you with excellent care. Hearing back from our patients is one way we can continue to improve our services. Please take a few minutes to complete the written survey that you may receive in the mail after your visit with us. Thank you!             Your Updated Medication List - Protect others around you: Learn how to safely use, store and throw away your medicines at www.disposemymeds.org.          This list is accurate as of 9/14/18 10:25 AM.  Always use your most recent med list.                   Brand Name Dispense Instructions for use Diagnosis    ALEVE PO      Take 550 mg by mouth daily as needed for moderate pain

## 2018-09-14 NOTE — NURSING NOTE
"Chief Complaint   Patient presents with     Chest Pain     tightness and pressure intermittent x 2-3 months.       Initial /88 (BP Location: Right arm, Patient Position: Chair, Cuff Size: Adult Large)  Pulse 76  Temp 97.7  F (36.5  C) (Tympanic)  Resp 16  Ht 5' 9\" (1.753 m)  Wt 246 lb (111.6 kg)  SpO2 98%  BMI 36.33 kg/m2 Estimated body mass index is 36.33 kg/(m^2) as calculated from the following:    Height as of this encounter: 5' 9\" (1.753 m).    Weight as of this encounter: 246 lb (111.6 kg).    Patient presents to the clinic using No DME    Health Maintenance that is potentially due pending provider review:  NONE    n/a    Is there anyone who you would like to be able to receive your results? No  If yes have patient fill out ERLIN  Leidy Manzano CMA    "

## 2021-06-27 ENCOUNTER — OFFICE VISIT (OUTPATIENT)
Dept: URGENT CARE | Facility: URGENT CARE | Age: 41
End: 2021-06-27
Payer: COMMERCIAL

## 2021-06-27 ENCOUNTER — ANCILLARY PROCEDURE (OUTPATIENT)
Dept: GENERAL RADIOLOGY | Facility: CLINIC | Age: 41
End: 2021-06-27
Attending: FAMILY MEDICINE
Payer: COMMERCIAL

## 2021-06-27 VITALS
TEMPERATURE: 98.3 F | HEART RATE: 90 BPM | WEIGHT: 261 LBS | SYSTOLIC BLOOD PRESSURE: 142 MMHG | BODY MASS INDEX: 38.66 KG/M2 | OXYGEN SATURATION: 99 % | RESPIRATION RATE: 16 BRPM | HEIGHT: 69 IN | DIASTOLIC BLOOD PRESSURE: 80 MMHG

## 2021-06-27 DIAGNOSIS — S69.91XA HAND INJURY, RIGHT, INITIAL ENCOUNTER: ICD-10-CM

## 2021-06-27 DIAGNOSIS — S20.211A RIB CONTUSION, RIGHT, INITIAL ENCOUNTER: Primary | ICD-10-CM

## 2021-06-27 DIAGNOSIS — S00.03XA CONTUSION OF SCALP, INITIAL ENCOUNTER: ICD-10-CM

## 2021-06-27 DIAGNOSIS — S20.211A RIB CONTUSION, RIGHT, INITIAL ENCOUNTER: ICD-10-CM

## 2021-06-27 PROCEDURE — 73130 X-RAY EXAM OF HAND: CPT | Mod: RT | Performed by: RADIOLOGY

## 2021-06-27 PROCEDURE — 71101 X-RAY EXAM UNILAT RIBS/CHEST: CPT | Mod: RT | Performed by: RADIOLOGY

## 2021-06-27 PROCEDURE — 99215 OFFICE O/P EST HI 40 MIN: CPT | Performed by: FAMILY MEDICINE

## 2021-06-27 RX ORDER — CYCLOBENZAPRINE HCL 5 MG
5 TABLET ORAL
Qty: 30 TABLET | Refills: 0 | Status: SHIPPED | OUTPATIENT
Start: 2021-06-27

## 2021-06-27 RX ORDER — IBUPROFEN 800 MG/1
800 TABLET, FILM COATED ORAL 3 TIMES DAILY
Qty: 21 TABLET | Refills: 0 | Status: SHIPPED | OUTPATIENT
Start: 2021-06-27 | End: 2021-07-04

## 2021-06-27 ASSESSMENT — MIFFLIN-ST. JEOR: SCORE: 2089.27

## 2021-06-27 ASSESSMENT — PAIN SCALES - GENERAL: PAINLEVEL: EXTREME PAIN (8)

## 2021-06-27 NOTE — PATIENT INSTRUCTIONS
Start ibuprofen 800 mg 3 times a day for 7 days with food and then as needed for pain    Start Tylenol #3 for severe pain-cannot drive on this medication-can take as needed every 6 hours I would try to only use this at bedtime for pain    Start cyclobenzaprine at night, helps with muscle pain/spasms and will make you sleepy, take one at bedtime, can take a second one if needed    Use your incentive spirometer in a.m., morning break, lunch, afternoon break when you get home and bedtime    Use your incentive spirometer 2 to 3 x 15 minutes at [facility], if at home every 15 minutes for 30 minutes total can buy this from [employer] or online or at the [facility] location      Patient Education     Rib Bruise   A rib bruise (contusion) can affect one or more rib bones. It may cause pain, tenderness, swelling, and a purplish discoloration. There may be a sharp pain while breathing.   You will be assessed for other injuries. You will likely be given pain medicine. Bruised ribs heal on their own, without further treatment. But the pain may take weeks to months to go away.    Note that a small crack (fracture) in the rib may cause the same symptoms as a bruised rib. The small crack may not be seen on a chest X-ray. But the conditions are managed in the same way.   Home care    Rest. Don't do heavy lifting, strenuous exertion, or any activity that causes pain.    Ice the area to reduce pain and swelling. Put ice cubes in a plastic bag or use a cold pack. Wrap the cold source in a thin towel. Don't place it directly on your skin. Ice the injured area for 20 minutes every 1 to 2 hours the first day. Continue with ice packs 3 to 4 times a day for the next 2 days, then as needed for the relief of pain and swelling.    Take any prescribed pain medicine as directed by your healthcare provider. If none was prescribed, take acetaminophen, ibuprofen, or naproxen to control pain.    If you have a significant injury, you may be given a  device called an incentive spirometer to keep your lungs healthy. Use as directed.    Follow-up care  Follow up with your healthcare provider, or as advised.   When to seek medical advice  Call your healthcare provider for any of the following:     Increasing chest pain with breathing    Coughing    New or worsening pain    Fever of 100.4 F (38 C) or higher, or as directed by your healthcare provider  Call 911  Call 911, or get medical care right away if any of the following occur:     Shortness of breath or trouble breathing    Dizziness, weakness, or fainting  [employer] last reviewed this educational content on 8/1/2019 2000-2021 [employer]. All rights reserved. This information is not intended as a substitute for professional medical care. Always follow your healthcare professional's instructions.
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
## 2021-06-27 NOTE — LETTER
[facility] [place]  [address] [address]  Phone: [phone]  Fax: [phone]    June 27, 2021        Azam Chino  [address] [address] [address]          To whom it may concern:    RE: Azam Chino    Patient was seen and treated today at our clinic.  Patient may return to work  with the following:  Light duty-unable to repetitively lift more than 10 pounds until 7/5/21.    Please contact me for questions or concerns.      Sincerely,        Bibi Angulo MD

## 2021-07-02 ENCOUNTER — OFFICE VISIT (OUTPATIENT)
Dept: FAMILY MEDICINE | Facility: CLINIC | Age: 41
End: 2021-07-02
Payer: COMMERCIAL

## 2021-07-02 VITALS
DIASTOLIC BLOOD PRESSURE: 89 MMHG | WEIGHT: 255 LBS | TEMPERATURE: 97 F | HEART RATE: 75 BPM | BODY MASS INDEX: 37.66 KG/M2 | SYSTOLIC BLOOD PRESSURE: 130 MMHG | OXYGEN SATURATION: 98 %

## 2021-07-02 DIAGNOSIS — S09.90XD INJURY OF HEAD, SUBSEQUENT ENCOUNTER: Primary | ICD-10-CM

## 2021-07-02 DIAGNOSIS — R07.89 CHEST WALL PAIN: ICD-10-CM

## 2021-07-02 PROCEDURE — 99213 OFFICE O/P EST LOW 20 MIN: CPT | Performed by: FAMILY MEDICINE

## 2021-07-02 RX ORDER — ACETAMINOPHEN AND CODEINE PHOSPHATE 300; 30 MG/1; MG/1
1-2 TABLET ORAL EVERY 4 HOURS PRN
COMMUNITY
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
## 2021-07-02 NOTE — NURSING NOTE
"Initial /89   Pulse 75   Temp 97  F (36.1  C) (Tympanic)   Wt 115.7 kg (255 lb)   SpO2 98%   BMI 37.66 kg/m   Estimated body mass index is 37.66 kg/m  as calculated from the following:    Height as of 6/27/21: 1.753 m (5' 9\").    Weight as of this encounter: 115.7 kg (255 lb). .      "
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
